# Patient Record
(demographics unavailable — no encounter records)

---

## 2024-12-13 NOTE — REVIEW OF SYSTEMS
[Negative] : Heme/Lymph [Fever] : no fever [Weight Gain (___ Lbs)] : no recent weight gain [Chills] : no chills [Feeling Fatigued] : not feeling fatigued [Weight Loss (___ Lbs)] : no recent weight loss [SOB] : no shortness of breath [Dyspnea on exertion] : not dyspnea during exertion [Chest Discomfort] : no chest discomfort [Palpitations] : no palpitations [Orthopnea] : no orthopnea [Syncope] : no syncope [Cough] : no cough

## 2024-12-13 NOTE — PHYSICAL EXAM
[Well Developed] : well developed [Well Nourished] : well nourished [No Acute Distress] : no acute distress [Normal Conjunctiva] : normal conjunctiva [5th Left ICS - MCL] : palpated at the 5th LICS in the midclavicular line [Normal Rate] : normal [Rhythm Regular] : regular [Normal S1] : normal S1 [Normal S2] : normal S2 [Pericardial Rub] : no pericardial rub [Clear Lung Fields] : clear lung fields [Good Air Entry] : good air entry [No Respiratory Distress] : no respiratory distress  [No Edema] : no edema [No Rash] : no rash [Moves all extremities] : moves all extremities [Alert and Oriented] : alert and oriented [Click] : no click [Normal Speech] : normal speech

## 2024-12-13 NOTE — HISTORY OF PRESENT ILLNESS
[FreeTextEntry1] : 86 year old female with HTN, HLD, CAD s/p PCI (last 9/2021) with episodes of lightheadedness, who presents for follow up.  She was initially seen in the office after getting out of bed she becomes lightheaded.  She states she is not eating or drinking more and hydrates with tea.  Her metoprolol was reduced.  No chest pain, SOB, orthopnea, PND, edema, syncope.    echo 11/2022 Normal EF, mild LAE, mild MR  She wore an monitor for two days 3/2023 and took it off early because it was bugging her.  While not wearing monitor [place] and states she "blacked out" while seated.   She did not hurt herself.  No chest pain, SOB, orthopnea or palpitations.    No other syncope since her last visit.  No recent near syncope.    1 day event monitor 3/2023 HR [id number] no arrhythmias

## 2024-12-13 NOTE — ADDENDUM
[FreeTextEntry1] : I, Marco Talley, hereby attest that the medical record entry for this patient accurately reflects signatures/notations that I made on the Date of Service in my capacity as an Attending Physician when I treated/diagnosed the above patient. I do hereby attest that this information is true, accurate and complete to the best of my knowledge and I understand that any falsification, omission, or concealment of material fact may subject me to administrative, civil, or, criminal liability. I agree with the note as written by my PA in its entirety. I was present for the entire visit and supervised the entire visit and agree with the plan as outlined.   I, Raphael Lynn, am scribing for and the presence of Dr. Talley the following sections: HPI, PMH,Family/social history, ROS, Physical Exam, Assessment / Plan.

## 2024-12-13 NOTE — DISCUSSION/SUMMARY
[FreeTextEntry1] : 86 year old female with HTN, HLD, CAD s/p PCI (last 9/2021) with episodes of lightheadedness, who presents for follow up evaluation.  No further syncope or near syncope.  We discussed long term monitoring with routine event monitors. wearable technology or a loop recorder.  She is not interested in a repeat monitor or ILR.  She will follow up with her general cardiologist and be referred back to our clinic as needed.   She knows to call with any questions or concerns.  [EKG obtained to assist in diagnosis and management of assessed problem(s)] : EKG obtained to assist in diagnosis and management of assessed problem(s)

## 2024-12-13 NOTE — CARDIOLOGY SUMMARY
[de-identified] : 5/2/23 sinus at 55bpm 3/30/23 sinus at 64bpm 6/29/23 sinus at 62bpm 3/21/24 sinus at 57bpm 12.4.24 sinus at 56bpm [de-identified] : 11/2022 NL EF, mild LAE, mild MR [de-identified] : cath 2021 PCI of pLAD with stent

## 2024-12-13 NOTE — CARDIOLOGY SUMMARY
[de-identified] : 5/2/23 sinus at 55bpm 3/30/23 sinus at 64bpm 6/29/23 sinus at 62bpm 3/21/24 sinus at 57bpm 12.4.24 sinus at 56bpm [de-identified] : 11/2022 NL EF, mild LAE, mild MR [de-identified] : cath 2021 PCI of pLAD with stent